# Patient Record
Sex: FEMALE | URBAN - METROPOLITAN AREA
[De-identification: names, ages, dates, MRNs, and addresses within clinical notes are randomized per-mention and may not be internally consistent; named-entity substitution may affect disease eponyms.]

---

## 2021-06-09 ENCOUNTER — OFFICE VISIT (OUTPATIENT)
Dept: URBAN - METROPOLITAN AREA CLINIC 44 | Facility: CLINIC | Age: 86
End: 2021-06-09
Payer: MEDICARE

## 2021-06-09 DIAGNOSIS — H43.813 VITREOUS DEGENERATION, BILATERAL: ICD-10-CM

## 2021-06-09 DIAGNOSIS — H04.123 TEAR FILM INSUFFICIENCY OF BILATERAL LACRIMAL GLANDS: ICD-10-CM

## 2021-06-09 DIAGNOSIS — H25.813 COMBINED FORMS OF AGE-RELATED CATARACT, BILATERAL: Primary | ICD-10-CM

## 2021-06-09 DIAGNOSIS — H35.3131 BILATERAL NONEXUDATIVE AGE-RELATED MACULAR DEGENERATION, EARLY DRY STAGE: ICD-10-CM

## 2021-06-09 PROCEDURE — 92004 COMPRE OPH EXAM NEW PT 1/>: CPT | Performed by: OPTOMETRIST

## 2021-06-09 PROCEDURE — 92134 CPTRZ OPH DX IMG PST SGM RTA: CPT | Performed by: OPTOMETRIST

## 2021-06-09 ASSESSMENT — INTRAOCULAR PRESSURE
OS: 17
OD: 9

## 2021-06-09 ASSESSMENT — KERATOMETRY
OS: 43.63
OD: 43.50

## 2021-06-09 ASSESSMENT — VISUAL ACUITY
OD: 20/40
OS: 20/40

## 2021-06-09 NOTE — IMPRESSION/PLAN
Impression: Bilateral nonexudative age-related macular degeneration, early dry stage: H35.3131. Per exam and OCT no SRF OU Plan: PLAN: Reviewed risk associated with smoking and lifestyle changes that support good eye health. Recommend Lutein/Zeaxanthin supplements to further reduce risk of progression.  RTC ASAP if notes and decreased vision or distortion in vision otherwise RTC 12 months for DFE/OCT (Mac)

## 2021-06-09 NOTE — IMPRESSION/PLAN
Impression: Tear film insufficiency of bilateral lacrimal glands: H04.123. Condition mild. Patient reports no or occasional symptoms. Clinical evaluation shows mild DED. Plan: PLAN: Warm compresses for 10 minutes AM (Marissa Mask or equal). Recommend Lipid based tears to be used 4X daily. RTC if symptom's worsen.

## 2022-06-29 ENCOUNTER — OFFICE VISIT (OUTPATIENT)
Dept: URBAN - METROPOLITAN AREA CLINIC 44 | Facility: CLINIC | Age: 87
End: 2022-06-29
Payer: MEDICARE

## 2022-06-29 DIAGNOSIS — H25.813 COMBINED FORMS OF AGE-RELATED CATARACT, BILATERAL: ICD-10-CM

## 2022-06-29 DIAGNOSIS — H52.4 PRESBYOPIA: ICD-10-CM

## 2022-06-29 DIAGNOSIS — H04.123 TEAR FILM INSUFFICIENCY OF BILATERAL LACRIMAL GLANDS: Primary | ICD-10-CM

## 2022-06-29 DIAGNOSIS — H35.3131 NONEXUDATIVE AGE-RELATED MACULAR DEGENERATION, BILATERAL, EARLY DRY STAGE: ICD-10-CM

## 2022-06-29 DIAGNOSIS — H43.813 VITREOUS DEGENERATION, BILATERAL: ICD-10-CM

## 2022-06-29 PROCEDURE — 92134 CPTRZ OPH DX IMG PST SGM RTA: CPT | Performed by: OPTOMETRIST

## 2022-06-29 PROCEDURE — 92014 COMPRE OPH EXAM EST PT 1/>: CPT | Performed by: OPTOMETRIST

## 2022-06-29 ASSESSMENT — INTRAOCULAR PRESSURE
OS: 10
OD: 11

## 2022-06-29 ASSESSMENT — VISUAL ACUITY
OD: 20/50
OS: 20/40

## 2022-06-29 ASSESSMENT — KERATOMETRY
OS: 43.75
OD: 43.50

## 2022-06-29 NOTE — IMPRESSION/PLAN
Impression: Bilateral nonexudative age-related macular degeneration, early dry stage: H35.3131. Plan: Mild drusen/RPE changes OU, no SRF OU Non-smoker Does not meet AREDS 2 criteria, but discussed lutein supplement RTC if notice changes in vision

## 2023-06-29 ENCOUNTER — OFFICE VISIT (OUTPATIENT)
Dept: URBAN - METROPOLITAN AREA CLINIC 44 | Facility: CLINIC | Age: 88
End: 2023-06-29
Payer: MEDICARE

## 2023-06-29 DIAGNOSIS — H52.4 PRESBYOPIA: ICD-10-CM

## 2023-06-29 DIAGNOSIS — H25.813 COMBINED FORMS OF AGE-RELATED CATARACT, BILATERAL: ICD-10-CM

## 2023-06-29 DIAGNOSIS — H04.123 TEAR FILM INSUFFICIENCY OF BILATERAL LACRIMAL GLANDS: Primary | ICD-10-CM

## 2023-06-29 DIAGNOSIS — H43.813 VITREOUS DEGENERATION, BILATERAL: ICD-10-CM

## 2023-06-29 DIAGNOSIS — H35.3131 NONEXUDATIVE AGE-RELATED MACULAR DEGENERATION, BILATERAL, EARLY DRY STAGE: ICD-10-CM

## 2023-06-29 PROCEDURE — 92134 CPTRZ OPH DX IMG PST SGM RTA: CPT | Performed by: OPTOMETRIST

## 2023-06-29 PROCEDURE — 99214 OFFICE O/P EST MOD 30 MIN: CPT | Performed by: OPTOMETRIST

## 2023-06-29 ASSESSMENT — KERATOMETRY
OS: 43.75
OD: 43.13

## 2023-06-29 ASSESSMENT — VISUAL ACUITY
OD: 20/40
OS: 20/30

## 2023-06-29 ASSESSMENT — INTRAOCULAR PRESSURE
OS: 10
OD: 10

## 2023-06-29 NOTE — IMPRESSION/PLAN
Impression: Bilateral nonexudative age-related macular degeneration, early dry stage: H35.3131. Plan: Mild drusen/RPE changes OU, no SRF OU -- stable Non-smoker Does not meet AREDS 2 criteria, but discussed lutein supplement RTC if notice changes in vision

## 2023-06-29 NOTE — IMPRESSION/PLAN
Impression: Presbyopia: H52.4. Plan: Release new spec rx. Vision will be limited OU by cataracts. Do not recommend night driving.

## 2023-06-29 NOTE — IMPRESSION/PLAN
Impression: Combined forms of age-related cataract, bilateral: H25.813. Plan: Patient is eligible for cataract surgery, but patient is satisfied with current level of vision and declined to have surgery at this time. RTC if vision changes. Monitor.